# Patient Record
Sex: MALE | Employment: OTHER | URBAN - METROPOLITAN AREA
[De-identification: names, ages, dates, MRNs, and addresses within clinical notes are randomized per-mention and may not be internally consistent; named-entity substitution may affect disease eponyms.]

---

## 2021-06-02 ENCOUNTER — OFFICE VISIT (OUTPATIENT)
Dept: DERMATOLOGY | Facility: CLINIC | Age: 71
End: 2021-06-02
Payer: COMMERCIAL

## 2021-06-02 VITALS
SYSTOLIC BLOOD PRESSURE: 183 MMHG | OXYGEN SATURATION: 94 % | HEART RATE: 77 BPM | DIASTOLIC BLOOD PRESSURE: 93 MMHG | WEIGHT: 270 LBS

## 2021-06-02 DIAGNOSIS — L40.9 PSORIASIS: Primary | ICD-10-CM

## 2021-06-02 PROCEDURE — 99202 OFFICE O/P NEW SF 15 MIN: CPT | Performed by: PHYSICIAN ASSISTANT

## 2021-06-02 RX ORDER — GLIPIZIDE 10 MG/1
10 TABLET ORAL 2 TIMES DAILY
COMMUNITY

## 2021-06-02 RX ORDER — INSULIN GLARGINE 100 [IU]/ML
INJECTION, SOLUTION SUBCUTANEOUS AT BEDTIME
COMMUNITY

## 2021-06-02 RX ORDER — LISINOPRIL 40 MG/1
40 TABLET ORAL DAILY
COMMUNITY

## 2021-06-02 NOTE — LETTER
6/2/2021         RE: Rojas Noriega  [address] [place] [address]        Dear Colleague,    Thank you for referring your patient, Rojas Noriega, to the [facility]. Please see a copy of my visit note below.    Rojas Noriega is an extremely pleasant 71 year old year old male patient here today for rash behind ear, present for awhile. He was referred by his rheumatologist to see if he has psoriasis. He reports that his rheumatologist is started otezla for psoriatic arthritis. Patient has no other skin complaints today.  Remainder of the HPI, Meds, PMH, Allergies, FH, and SH was reviewed in chart.    History reviewed. No pertinent past medical history.    History reviewed. No pertinent surgical history.     History reviewed. No pertinent family history.    Social History     Socioeconomic History     Marital status: Single     Spouse name: Not on file     Number of children: Not on file     Years of education: Not on file     Highest education level: Not on file   Occupational History     Not on file   Social Needs     Financial resource strain: Not on file     Food insecurity     Worry: Not on file     Inability: Not on file     Transportation needs     Medical: Not on file     Non-medical: Not on file   Tobacco Use     Smoking status: Never Smoker     Smokeless tobacco: Never Used   Substance and Sexual Activity     Alcohol use: Not on file     Drug use: Not on file     Sexual activity: Not on file   Lifestyle     Physical activity     Days per week: Not on file     Minutes per session: Not on file     Stress: Not on file   Relationships     Social connections     Talks on phone: Not on file     Gets together: Not on file     Attends Cheondoism service: Not on file     Active member of club or organization: Not on file     Attends meetings of clubs or organizations: Not on file     Relationship status: Not on file     Intimate partner violence     Fear of current or ex partner: Not on  file     Emotionally abused: Not on file     Physically abused: Not on file     Forced sexual activity: Not on file   Other Topics Concern     Not on file   Social History Narrative     Not on file       Outpatient Encounter Medications as of 6/2/2021   Medication Sig Dispense Refill     glipiZIDE (GLUCOTROL) 10 MG tablet Take 10 mg by mouth 2 times daily       insulin glargine (LANTUS VIAL) 100 UNIT/ML vial Inject Subcutaneous At Bedtime       lisinopril (ZESTRIL) 40 MG tablet Take 40 mg by mouth daily       No facility-administered encounter medications on file as of 6/2/2021.              O:   NAD, WDWN, Alert & Oriented, Mood & Affect wnl, Vitals stable   Here today alone   BP (!) 183/93   Pulse 77   Wt 122.5 kg (270 lb)   SpO2 94%    General appearance normal   Vitals stable   Alert, oriented and in no acute distress      Small psoriasiform plaques posterior auricular scalp   Skin otherwise clear     Eyes: Conjunctivae/lids:Normal     ENT: Lips: normal    MSK:Normal    Pulm: Breathing Normal     Neuro/Psych: Orientation:Alert and Orientedx3 ; Mood/Affect:normal     A/P:  1. Psoriasis   Rheumatology, just prescribed otezla to help treat his psoriatic arthritis.   He does not want topical steroids at this time.     Discussed to check with rheumatologist to see status of Otezla.       Again, thank you for allowing me to participate in the care of your patient.        Sincerely,        Tata Christian PA-C

## 2021-06-02 NOTE — PROGRESS NOTES
Rojas Noriega is an extremely pleasant 71 year old year old male patient here today for rash behind ear, present for awhile. He was referred by his rheumatologist to see if he has psoriasis. He reports that his rheumatologist is started otezla for psoriatic arthritis. Patient has no other skin complaints today.  Remainder of the HPI, Meds, PMH, Allergies, FH, and SH was reviewed in chart.    History reviewed. No pertinent past medical history.    History reviewed. No pertinent surgical history.     History reviewed. No pertinent family history.    Social History     Socioeconomic History     Marital status: Single     Spouse name: Not on file     Number of children: Not on file     Years of education: Not on file     Highest education level: Not on file   Occupational History     Not on file   Social Needs     Financial resource strain: Not on file     Food insecurity     Worry: Not on file     Inability: Not on file     Transportation needs     Medical: Not on file     Non-medical: Not on file   Tobacco Use     Smoking status: Never Smoker     Smokeless tobacco: Never Used   Substance and Sexual Activity     Alcohol use: Not on file     Drug use: Not on file     Sexual activity: Not on file   Lifestyle     Physical activity     Days per week: Not on file     Minutes per session: Not on file     Stress: Not on file   Relationships     Social connections     Talks on phone: Not on file     Gets together: Not on file     Attends Caodaism service: Not on file     Active member of club or organization: Not on file     Attends meetings of clubs or organizations: Not on file     Relationship status: Not on file     Intimate partner violence     Fear of current or ex partner: Not on file     Emotionally abused: Not on file     Physically abused: Not on file     Forced sexual activity: Not on file   Other Topics Concern     Not on file   Social History Narrative     Not on file       Outpatient Encounter Medications as of  6/2/2021   Medication Sig Dispense Refill     glipiZIDE (GLUCOTROL) 10 MG tablet Take 10 mg by mouth 2 times daily       insulin glargine (LANTUS VIAL) 100 UNIT/ML vial Inject Subcutaneous At Bedtime       lisinopril (ZESTRIL) 40 MG tablet Take 40 mg by mouth daily       No facility-administered encounter medications on file as of 6/2/2021.              O:   NAD, WDWN, Alert & Oriented, Mood & Affect wnl, Vitals stable   Here today alone   BP (!) 183/93   Pulse 77   Wt 122.5 kg (270 lb)   SpO2 94%    General appearance normal   Vitals stable   Alert, oriented and in no acute distress      Small psoriasiform plaques posterior auricular scalp   Skin otherwise clear     Eyes: Conjunctivae/lids:Normal     ENT: Lips: normal    MSK:Normal    Pulm: Breathing Normal     Neuro/Psych: Orientation:Alert and Orientedx3 ; Mood/Affect:normal     A/P:  1. Psoriasis   Rheumatology, just prescribed otezla to help treat his psoriatic arthritis.   He does not want topical steroids at this time.     Discussed to check with rheumatologist to see status of Otezla.